# Patient Record
(demographics unavailable — no encounter records)

---

## 2024-12-05 NOTE — HISTORY OF PRESENT ILLNESS
[One fall no injury in past year] : Patient reported one fall in the past year without injury [Patient is independent with] : bathing [Independent] : managing finances [] : Assistance needed doing laundry [Walker] : walker [Smoke Detector] : smoke detector [Carbon Monoxide Detector] : carbon monoxide detector [Grab Bars] : grab bars [Shower Chair] : shower chair [Night Light] : night light [Anti-Slip Measures] : anti-slip measures [Driving Concerns] : driving concerns noted [Wears Seat Belt] : wears seat belt [Wears Sunscreen] : wears sunscreen [NO] : No [Little interest or pleasure doing things] : 1) Little interest or pleasure doing things [Feeling down, depressed, or hopeless] : 2) Feeling down, depressed, or hopeless [0] : 2) Feeling down, depressed, or hopeless: Not at all (0) [PHQ-2 Negative - No further assessment needed] : PHQ-2 Negative - No further assessment needed [FreeTextEntry1] : 89 y/o M with PMHx of knee OA (R>L) chronic lower back pain, h/o sacral fracture, HLD presents to the practice for a follow up visit.  Back pain: Acute on chronic, feels it has gotten worse gradually since about 2 weeks. No falls or trauma noted. Denies radiculopathy, groin anesthesia, urinary/bowel incontinence. Using RW for support. Not really using any pain medications, would like to avoid if possible.  BPH: No prostate cancer, gets up at least 3 to 4 times a night, no blood in the urine, no family history of prostate cancer No UTI's.  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 Has not been on chemotherapy.  OA: Most notably in his lower back and knees. No neuropathy noted. No groin anesthesia noted. Take Tylenol for pain. Has done several bouts of PT in the past and not interested at this time. Uses a RW to ambulate. Has had a fall around ?March 2023. Had a sacral fx which was not treated with surgery.  Cognitive decline: Feels he is getting mentally slower and has issues with short-term memory. Long-term memory intact. No hallucinations/delusions, anxiety, depression noted. Sleep is mostly intact though he has to get up at night to go to the bathroom. Wife passed away in her sleep in June 2024, cardiopulmonary arrest 2/2 heart failure. Has no formal HHA support, only has a cleaning lady for many years, once a week. Daughter/HCP Helen Drea ([phone]) and son-in-law/HCP Harrison Drea ([phone]) are deeply involved with patient's care despite living in [place]. They visit almost bi-weekly and communicate with patient daily over the phone multiple times a day. Would like for patient to move to [place] with them but want him to make his own decision. He chooses to stay in [place].  Hearing loss: Uses hearing aids but we needed to use pocket talker to help further. [Stair Lift] : no stair lift used in home [EOB2Unaxt] : 0

## 2024-12-05 NOTE — HISTORY OF PRESENT ILLNESS
[One fall no injury in past year] : Patient reported one fall in the past year without injury [Patient is independent with] : bathing [Independent] : managing finances [] : Assistance needed doing laundry [Walker] : walker [Smoke Detector] : smoke detector [Carbon Monoxide Detector] : carbon monoxide detector [Grab Bars] : grab bars [Shower Chair] : shower chair [Night Light] : night light [Anti-Slip Measures] : anti-slip measures [Driving Concerns] : driving concerns noted [Wears Seat Belt] : wears seat belt [Wears Sunscreen] : wears sunscreen [NO] : No [Little interest or pleasure doing things] : 1) Little interest or pleasure doing things [Feeling down, depressed, or hopeless] : 2) Feeling down, depressed, or hopeless [0] : 2) Feeling down, depressed, or hopeless: Not at all (0) [PHQ-2 Negative - No further assessment needed] : PHQ-2 Negative - No further assessment needed [FreeTextEntry1] : 89 y/o M with PMHx of knee OA (R>L) chronic lower back pain, h/o sacral fracture, HLD presents to the practice for a follow up visit.  Back pain: Acute on chronic, feels it has gotten worse gradually since about 2 weeks. No falls or trauma noted. Denies radiculopathy, groin anesthesia, urinary/bowel incontinence. Using RW for support. Not really using any pain medications, would like to avoid if possible.  BPH: No prostate cancer, gets up at least 3 to 4 times a night, no blood in the urine, no family history of prostate cancer No UTI's.  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 Has not been on chemotherapy.  OA: Most notably in his lower back and knees. No neuropathy noted. No groin anesthesia noted. Take Tylenol for pain. Has done several bouts of PT in the past and not interested at this time. Uses a RW to ambulate. Has had a fall around ?March 2023. Had a sacral fx which was not treated with surgery.  Cognitive decline: Feels he is getting mentally slower and has issues with short-term memory. Long-term memory intact. No hallucinations/delusions, anxiety, depression noted. Sleep is mostly intact though he has to get up at night to go to the bathroom. Wife passed away in her sleep in June 2024, cardiopulmonary arrest 2/2 heart failure. Has no formal HHA support, only has a cleaning lady for many years, once a week. Daughter/HCP Helen Drea ([phone]) and son-in-law/HCP Harrison Drea ([phone]) are deeply involved with patient's care despite living in [place]. They visit almost bi-weekly and communicate with patient daily over the phone multiple times a day. Would like for patient to move to [place] with them but want him to make his own decision. He chooses to stay in [place].  Hearing loss: Uses hearing aids but we needed to use pocket talker to help further. [Stair Lift] : no stair lift used in home [[id number]] : 0

## 2025-03-06 NOTE — PHYSICAL EXAM
[Midline] : trachea located in midline position [Normal] : no rashes [de-identified] : bilateral cerumen impaction, removed. Wearing bilateral hearing aids.

## 2025-03-06 NOTE — HISTORY OF PRESENT ILLNESS
[de-identified] : Jerome Brush is a 91 year old male with history of SNHL who presents for evaluation of bilateral aural fullness. He states he has had bilateral aural fullness that feels like wax for several months. He uses hearing aids. He states his last audiogram with outside audiology was stable and was done 3 months ago. He denies change in hearing loss, tinnitus, or vertigo. He denies otalgia or otorrhea. He denies recurrent ear infections or fever. He denies family history of hearing loss. He denies ototoxic drug exposure or significant loud noise exposure. He denies head trauma

## 2025-03-06 NOTE — REVIEW OF SYSTEMS
[Hearing Loss] : hearing loss [Joint Pain] : joint pain [Depression] : depression [Negative] : Heme/Lymph

## 2025-03-06 NOTE — ASSESSMENT
[FreeTextEntry1] : Jerome Brush presents for evaluation of bilateral aural fullness. Otoscopic exam revealed bilateral cerumen impaction, which was removed. After removal, remainder exam wnl. He states resolution of aural fullness. He will follow up with his audiologist.  His blood pressure is elevated today. He called his PCP who will see him today.  -follow up prn

## 2025-03-14 NOTE — HISTORY OF PRESENT ILLNESS
[One fall no injury in past year] : Patient reported one fall in the past year without injury [Patient is independent with] : bathing [Independent] : managing finances [] : Assistance needed doing laundry [Walker] : walker [Smoke Detector] : smoke detector [Carbon Monoxide Detector] : carbon monoxide detector [Grab Bars] : grab bars [Shower Chair] : shower chair [Night Light] : night light [Anti-Slip Measures] : anti-slip measures [Driving Concerns] : driving concerns noted [Wears Seat Belt] : wears seat belt [Wears Sunscreen] : wears sunscreen [Little interest or pleasure doing things] : 1) Little interest or pleasure doing things [Feeling down, depressed, or hopeless] : 2) Feeling down, depressed, or hopeless [0] : 2) Feeling down, depressed, or hopeless: Not at all (0) [PHQ-2 Negative - No further assessment needed] : PHQ-2 Negative - No further assessment needed [I have developed a follow-up plan documented below in the note.] : I have developed a follow-up plan documented below in the note. [FreeTextEntry1] : 89 y/o M with PMHx of knee OA (R>L) chronic lower back pain, h/o sacral fracture, HLD presents to the practice for an acute visit.  HTN: BP elevated with ENT. Denies chest pain, palpitations, SOB. Has had elevated BP in the past in the office which resolved after retakes but this time it is not going down. Denies pain or anxiety at this time.  Back pain: No falls or trauma noted. Denies radiculopathy, groin anesthesia, urinary/bowel incontinence. Using RW for support. Not really using any pain medications, would like to avoid if possible. Not fully utilizing tylenol.  BPH: No prostate cancer, gets up at least 3 to 4 times a night, no blood in the urine, no family history of prostate cancer No UTI's.  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 Has not been on chemotherapy.  OA: Most notably in his lower back and knees. No neuropathy noted. No groin anesthesia noted. Take Tylenol for pain. Has done several bouts of PT in the past and not interested at this time. Uses a RW to ambulate. Has had a fall around ?March 2023. Had a sacral fx which was not treated with surgery.  Cognitive decline: Feels he is getting mentally slower and has issues with short-term memory. Long-term memory intact. No hallucinations/delusions, anxiety, depression noted. Sleep is mostly intact though he has to get up at night to go to the bathroom. Wife passed away in her sleep in June 2024, cardiopulmonary arrest 2/2 heart failure. Has no formal HHA support, only has a cleaning lady for many years, once a week. Daughter/HCP Helen Drea ([phone]) and son-in-law/HCP Harrison Drea ([phone]) are deeply involved with patient's care despite living in [place]. They visit almost bi-weekly and communicate with patient daily over the phone multiple times a day. Would like for patient to move to [place] with them but want him to make his own decision. He chooses to stay in [place].  Hearing loss: Uses hearing aids but we needed to use pocket talker to help further. [Stair Lift] : no stair lift used in home [[id number]] : 0

## 2025-03-14 NOTE — REVIEW OF SYSTEMS
1-2 persons [As Noted in HPI] : as noted in HPI [As noted in HPI] : as noted in HPI [Negative] : Heme/Lymph

## 2025-03-14 NOTE — ASSESSMENT
[FreeTextEntry1] : RTC in 1 week for follow up visit.  Total time spent: 30 mins This includes chart review, patient assessment, discussion and collaboration with interdisciplinary team members, excluding time spent on separately billable services.

## 2025-03-16 NOTE — PHYSICAL EXAM
[Alert] : alert [No Acute Distress] : in no acute distress [No Respiratory Distress] : no respiratory distress [Respiration, Rhythm And Depth] : normal respiratory rhythm and effort [Normal S1, S2] : normal S1 and S2 [Heart Rate And Rhythm] : heart rate was normal and rhythm regular [Bowel Sounds] : normal bowel sounds [Abdomen Tenderness] : non-tender [Abdomen Soft] : soft [Involuntary Movements] : no involuntary movements were seen [Normal Color / Pigmentation] : normal skin color and pigmentation [No Focal Deficits] : no focal deficits [Normal Affect] : the affect was normal [Normal Mood] : the mood was normal [Normal Outer Ear/Nose] : the ears and nose were normal in appearance [Normal Appearance] : the appearance of the neck was normal [Supple] : the neck was supple [No Acc Muscle Use] : no accessory muscle use [Auscultation Breath Sounds / Voice Sounds] : lungs were clear to auscultation bilaterally [No Spinal Tenderness] : no spinal tenderness [Normal Turgor] : normal skin turgor [de-identified] : Hard of hearing, needed pocket talker despite having hearing aids

## 2025-03-16 NOTE — HISTORY OF PRESENT ILLNESS
[One fall no injury in past year] : Patient reported one fall in the past year without injury [Patient is independent with] : bathing [Independent] : managing finances [] : Assistance needed doing laundry [Walker] : walker [Smoke Detector] : smoke detector [Carbon Monoxide Detector] : carbon monoxide detector [Grab Bars] : grab bars [Shower Chair] : shower chair [Night Light] : night light [Anti-Slip Measures] : anti-slip measures [Driving Concerns] : driving concerns noted [Wears Seat Belt] : wears seat belt [Wears Sunscreen] : wears sunscreen [Little interest or pleasure doing things] : 1) Little interest or pleasure doing things [Feeling down, depressed, or hopeless] : 2) Feeling down, depressed, or hopeless [0] : 2) Feeling down, depressed, or hopeless: Not at all (0) [PHQ-2 Negative - No further assessment needed] : PHQ-2 Negative - No further assessment needed [I have developed a follow-up plan documented below in the note.] : I have developed a follow-up plan documented below in the note. [FreeTextEntry1] : 92 y/o M with PMHx of knee OA (R>L) chronic lower back pain, h/o sacral fracture, HLD presents to the practice for a follow up visit.  HTN: Was recently seen by me for reported HTN from ENT appointment. At our office he had a BP of 187/66 and repeat of 165/66. I had started him on amlodipine 2.5 mg daily. Remainder of medications are unchanged. Denies chest pain, palpitations, SOB. Still has elevated BP w/o acute sx.  Back pain: Acute on chronic, feels it has gotten worse gradually since about 2 weeks. No falls or trauma noted. Denies radiculopathy, groin anesthesia, urinary/bowel incontinence. Using RW for support. Not really using any pain medications, would like to avoid if possible.  BPH: No prostate cancer, gets up at least 3 to 4 times a night, no blood in the urine, no family history of prostate cancer No UTI's.  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 Has not been on chemotherapy.  OA: Most notably in his lower back and knees. No neuropathy noted. No groin anesthesia noted. Take Tylenol for pain. Has done several bouts of PT in the past and not interested at this time. Uses a RW to ambulate. Has had a fall around ?March 2023. Had a sacral fx which was not treated with surgery.  Cognitive decline: Feels he is getting mentally slower and has issues with short-term memory. Long-term memory intact. No hallucinations/delusions, anxiety, depression noted. Sleep is mostly intact though he has to get up at night to go to the bathroom. Wife passed away in her sleep in June 2024, cardiopulmonary arrest 2/2 heart failure. Has no formal HHA support, only has a cleaning lady for many years, once a week. Daughter/HCP Helen Shepard ([phone]) and son-in-law/HCP Harrison Drea ([phone]) are deeply involved with patient's care despite living in [place]. They visit almost bi-weekly and communicate with patient daily over the phone multiple times a day. Would like for patient to move to [place] with them but want him to make his own decision. He chooses to stay in [place].  Hearing loss: Uses hearing aids, need to use pocket talker to aid in the conversation.  [Stair Lift] : no stair lift used in home [[id number]] : 0

## 2025-03-16 NOTE — PHYSICAL EXAM
[Alert] : alert [No Acute Distress] : in no acute distress [No Respiratory Distress] : no respiratory distress [Respiration, Rhythm And Depth] : normal respiratory rhythm and effort [Normal S1, S2] : normal S1 and S2 [Heart Rate And Rhythm] : heart rate was normal and rhythm regular [Bowel Sounds] : normal bowel sounds [Abdomen Tenderness] : non-tender [Abdomen Soft] : soft [Involuntary Movements] : no involuntary movements were seen [Normal Color / Pigmentation] : normal skin color and pigmentation [No Focal Deficits] : no focal deficits [Normal Affect] : the affect was normal [Normal Mood] : the mood was normal [Normal Outer Ear/Nose] : the ears and nose were normal in appearance [Normal Appearance] : the appearance of the neck was normal [Supple] : the neck was supple [No Acc Muscle Use] : no accessory muscle use [Auscultation Breath Sounds / Voice Sounds] : lungs were clear to auscultation bilaterally [No Spinal Tenderness] : no spinal tenderness [Normal Turgor] : normal skin turgor [de-identified] : Hard of hearing, needed pocket talker despite having hearing aids

## 2025-03-16 NOTE — HISTORY OF PRESENT ILLNESS
Physical Therapy    Pts recheck visit was cx by our dept due to no authorization from pts insurance.      Erick Benavides, PT #[id number] [One fall no injury in past year] : Patient reported one fall in the past year without injury [Patient is independent with] : bathing [Independent] : managing finances [] : Assistance needed doing laundry [Walker] : walker [Smoke Detector] : smoke detector [Carbon Monoxide Detector] : carbon monoxide detector [Grab Bars] : grab bars [Shower Chair] : shower chair [Night Light] : night light [Anti-Slip Measures] : anti-slip measures [Driving Concerns] : driving concerns noted [Wears Seat Belt] : wears seat belt [Wears Sunscreen] : wears sunscreen [Little interest or pleasure doing things] : 1) Little interest or pleasure doing things [Feeling down, depressed, or hopeless] : 2) Feeling down, depressed, or hopeless [0] : 2) Feeling down, depressed, or hopeless: Not at all (0) [PHQ-2 Negative - No further assessment needed] : PHQ-2 Negative - No further assessment needed [I have developed a follow-up plan documented below in the note.] : I have developed a follow-up plan documented below in the note. [FreeTextEntry1] : 92 y/o M with PMHx of knee OA (R>L) chronic lower back pain, h/o sacral fracture, HLD presents to the practice for a follow up visit.  HTN: Was recently seen by me for reported HTN from ENT appointment. At our office he had a BP of 187/66 and repeat of 165/66. I had started him on amlodipine 2.5 mg daily. Remainder of medications are unchanged. Denies chest pain, palpitations, SOB. Still has elevated BP w/o acute sx.  Back pain: Acute on chronic, feels it has gotten worse gradually since about 2 weeks. No falls or trauma noted. Denies radiculopathy, groin anesthesia, urinary/bowel incontinence. Using RW for support. Not really using any pain medications, would like to avoid if possible.  BPH: No prostate cancer, gets up at least 3 to 4 times a night, no blood in the urine, no family history of prostate cancer No UTI's.  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 Has not been on chemotherapy.  OA: Most notably in his lower back and knees. No neuropathy noted. No groin anesthesia noted. Take Tylenol for pain. Has done several bouts of PT in the past and not interested at this time. Uses a RW to ambulate. Has had a fall around ?March 2023. Had a sacral fx which was not treated with surgery.  Cognitive decline: Feels he is getting mentally slower and has issues with short-term memory. Long-term memory intact. No hallucinations/delusions, anxiety, depression noted. Sleep is mostly intact though he has to get up at night to go to the bathroom. Wife passed away in her sleep in June 2024, cardiopulmonary arrest 2/2 heart failure. Has no formal HHA support, only has a cleaning lady for many years, once a week. Daughter/HCP Helen Shepard ([phone]) and son-in-law/HCP Harrison Drea ([phone]) are deeply involved with patient's care despite living in [place]. They visit almost bi-weekly and communicate with patient daily over the phone multiple times a day. Would like for patient to move to [place] with them but want him to make his own decision. He chooses to stay in [place].  Hearing loss: Uses hearing aids, need to use pocket talker to aid in the conversation.  [Stair Lift] : no stair lift used in home [[id number]] : 0

## 2025-03-16 NOTE — PHYSICAL EXAM
[Alert] : alert [No Acute Distress] : in no acute distress [No Respiratory Distress] : no respiratory distress [Respiration, Rhythm And Depth] : normal respiratory rhythm and effort [Normal S1, S2] : normal S1 and S2 [Heart Rate And Rhythm] : heart rate was normal and rhythm regular [Bowel Sounds] : normal bowel sounds [Abdomen Tenderness] : non-tender [Abdomen Soft] : soft [Involuntary Movements] : no involuntary movements were seen [Normal Color / Pigmentation] : normal skin color and pigmentation [No Focal Deficits] : no focal deficits [Normal Affect] : the affect was normal [Normal Mood] : the mood was normal [Normal Outer Ear/Nose] : the ears and nose were normal in appearance [Normal Appearance] : the appearance of the neck was normal [Supple] : the neck was supple [No Acc Muscle Use] : no accessory muscle use [Auscultation Breath Sounds / Voice Sounds] : lungs were clear to auscultation bilaterally [No Spinal Tenderness] : no spinal tenderness [Normal Turgor] : normal skin turgor [de-identified] : Hard of hearing, needed pocket talker despite having hearing aids

## 2025-03-16 NOTE — HISTORY OF PRESENT ILLNESS
[One fall no injury in past year] : Patient reported one fall in the past year without injury [Patient is independent with] : bathing [Independent] : managing finances [] : Assistance needed doing laundry [Walker] : walker [Smoke Detector] : smoke detector [Carbon Monoxide Detector] : carbon monoxide detector [Grab Bars] : grab bars [Shower Chair] : shower chair [Night Light] : night light [Anti-Slip Measures] : anti-slip measures [Driving Concerns] : driving concerns noted [Wears Seat Belt] : wears seat belt [Wears Sunscreen] : wears sunscreen [Little interest or pleasure doing things] : 1) Little interest or pleasure doing things [Feeling down, depressed, or hopeless] : 2) Feeling down, depressed, or hopeless [0] : 2) Feeling down, depressed, or hopeless: Not at all (0) [PHQ-2 Negative - No further assessment needed] : PHQ-2 Negative - No further assessment needed [I have developed a follow-up plan documented below in the note.] : I have developed a follow-up plan documented below in the note. [FreeTextEntry1] : 92 y/o M with PMHx of knee OA (R>L) chronic lower back pain, h/o sacral fracture, HLD presents to the practice for a follow up visit.  HTN: Was recently seen by me for reported HTN from ENT appointment. At our office he had a BP of 187/66 and repeat of 165/66. I had started him on amlodipine 2.5 mg daily. Remainder of medications are unchanged. Denies chest pain, palpitations, SOB. Still has elevated BP w/o acute sx.  Back pain: Acute on chronic, feels it has gotten worse gradually since about 2 weeks. No falls or trauma noted. Denies radiculopathy, groin anesthesia, urinary/bowel incontinence. Using RW for support. Not really using any pain medications, would like to avoid if possible.  BPH: No prostate cancer, gets up at least 3 to 4 times a night, no blood in the urine, no family history of prostate cancer No UTI's.  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 Has not been on chemotherapy.  OA: Most notably in his lower back and knees. No neuropathy noted. No groin anesthesia noted. Take Tylenol for pain. Has done several bouts of PT in the past and not interested at this time. Uses a RW to ambulate. Has had a fall around ?March 2023. Had a sacral fx which was not treated with surgery.  Cognitive decline: Feels he is getting mentally slower and has issues with short-term memory. Long-term memory intact. No hallucinations/delusions, anxiety, depression noted. Sleep is mostly intact though he has to get up at night to go to the bathroom. Wife passed away in her sleep in June 2024, cardiopulmonary arrest 2/2 heart failure. Has no formal HHA support, only has a cleaning lady for many years, once a week. Daughter/HCP Helen Shepard ([phone]) and son-in-law/HCP Harrison Drea ([phone]) are deeply involved with patient's care despite living in [place]. They visit almost bi-weekly and communicate with patient daily over the phone multiple times a day. Would like for patient to move to [place] with them but want him to make his own decision. He chooses to stay in [place].  Hearing loss: Uses hearing aids, need to use pocket talker to aid in the conversation.  [Stair Lift] : no stair lift used in home [[id number]] : 0

## 2025-03-16 NOTE — ASSESSMENT
[FreeTextEntry1] : RTC in 1 week for cognitive evaluation.  Total time spent: 30 mins This includes chart review, patient assessment, discussion and collaboration with interdisciplinary team members, excluding time spent on separately billable services.  EKG obtained to assist in diagnosis and management of assessed problem(s).  Patient seen and evaluated with fellow physician. I was present during key portions of the history and physical exam and reviewed and agree with fellow physician's assessment and plan unless otherwise as noted below.

## 2025-04-22 NOTE — GOALS
[Staff: _____] : staff - [unfilled] [Healthcare proxy document is in chart] : Healthcare proxy document is in chart [DNR/DNI] : DNR/DNI [MOLST Completed] : MOLST Completed [FreeTextEntry1] : Helen [FreeTextEntry2] : daughter  [FreeTextEntry3] : [phone] [FreeTextEntry7] : Advance Directives: HCP/Advance Directives/Living will:Helen Shepard HCP/Alternate Decision Maker:Harrison Shepard MOLST:completed -DNR/DNI -send to the hospital if necessary -limited medical interventions -determine use or limitation feeding tube -use IV fluids -use antibiotics -determine use or limitation  HD   What matters most? "the health of my daughter and son in law"

## 2025-05-27 NOTE — HISTORY OF PRESENT ILLNESS
[One fall no injury in past year] : Patient reported one fall in the past year without injury [Patient is independent with] : bathing [Independent] : managing finances [] : Assistance needed doing laundry [FAST Score: ____] : Functional Assessment Scale (FAST) Score: [unfilled] [Walker] : walker [Smoke Detector] : smoke detector [Carbon Monoxide Detector] : carbon monoxide detector [Grab Bars] : grab bars [Shower Chair] : shower chair [Night Light] : night light [Anti-Slip Measures] : anti-slip measures [Driving Concerns] : driving concerns noted [Wears Seat Belt] : wears seat belt [Wears Sunscreen] : wears sunscreen [Little interest or pleasure doing things] : 1) Little interest or pleasure doing things [Feeling down, depressed, or hopeless] : 2) Feeling down, depressed, or hopeless [0] : 2) Feeling down, depressed, or hopeless: Not at all (0) [PHQ-2 Negative - No further assessment needed] : PHQ-2 Negative - No further assessment needed [I have developed a follow-up plan documented below in the note.] : I have developed a follow-up plan documented below in the note. [Reviewed updated] : Reviewed, updated [Designated Healthcare Proxy] : Designated healthcare proxy [Name: ___] : Health Care Proxy's Name: [unfilled]  [Relationship: ___] : Relationship: [unfilled] [DNR] : DNR [DNI] : DNI [I will adhere to the patient's wishes.] : I will adhere to the patient's wishes. [FreeTextEntry1] : GUIDE Model ID #:[id number]   90 y/o M with PMHx of knee OA (R>L) chronic lower back pain, h/o sacral fracture, HLD presents to the practice for a cognitive evaluation. Pt. presents alone, for voluntary enrollment in the GUIDE model program for management of dementia-related issues and coordination of dementia care. -will not be enrolling into GUIDE, pt. moving to [place] in the next 2-3 months -summarized visit to daughter Helen via telephone  PCP:     #dementia  - Mentation: (initial, progression, current) Cognitive decline: Feels he is getting mentally slower and has issues with short-term memory. Long-term memory intact. No hallucinations/delusions, anxiety, depression noted. Sleep is mostly intact though he has to get up at night to go to the bathroom. Wife passed away in her sleep in June 2024, cardiopulmonary arrest 2/2 heart failure. Has no formal HHA support, only has a cleaning lady for many years, once a week. Daughter/HCP Helen Shepard ([phone]) and son-in-law/HCP Harrison Drea ([phone]) are deeply involved with patient's care despite living in [place]. They visit almost bi-weekly and communicate with patient daily over the phone multiple times a day. Would like for patient to move to [place] with them but want him to make his own decision. He chooses to stay in [place]. MOCA: 22/30 continues to drive - MRI Brain: - PET Brain FDG: - MoCA: - BPS: - ANXIETY screen: - DEPRESSION screen: - SLEEP:sleeping well  - APPETITE:eating well, cooking and sometimes eats out  - TOILETING: denies UI, denies FI - MOBILITY: use walker  - Hearing impairment: wears hearing aids, ENT f/u on Thursday  - Vision impairment: wears glasses  -goes to the gym 2x/week  -moving to [facility] in [place] called [facility]  -connected pt. to [facility] in [place], they have a GUIDE program  HTN: -taking his medications daily, on amlodipine 10mg QD and losartan 12.5mg, denies SE -BP improved, today 137/60 -orthostatic BP negative -Denies chest pain, palpitations, SOB  Back pain: Acute on chronic, feels it has gotten worse gradually since about 2 weeks. No falls or trauma noted. Denies radiculopathy, groin anesthesia, urinary/bowel incontinence. Taking Tylenol BID for pain.  BPH: No prostate cancer, gets up at least 3 to 4 times a night, no blood in the urine, no family history of prostate cancer No UTI's.  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 Has not been on chemotherapy.  OA: Most notably in his lower back and knees. No neuropathy noted. No groin anesthesia noted. Take Tylenol for pain. Has done several bouts of PT in the past and not interested at this time. Uses a RW to ambulate. Has had a fall around ?March 2023. Had a sacral fx which was not treated with surgery. I encouraged him to maximize tylenol extra strength 1 g to TID. Would prefer to avoid gabapentin for now.   Hearing loss: Uses hearing aids, need to use pocket talker to aid in the conversation.   #HCM -Dr. Banks     Denies pain. Denies acute visits/falls. Denies HA/dizziness, SOB/CP, abdominal pain, dysuria, reports daily BMs regular.   CAREGIVER 1:  Name/Relationship:Helen  Involvement in care: Mailing Address: Phone Number: E-mail: Best time to reach this person:  Patient permission to contact this person:  CAREGIVER 2:  Name/Relationship:Harrison  Involvement in care: Mailing Address: Phone Number: E-mail: Best time to reach this person:  Patient permission to contact this person:yes   Suspect Medications (associated with mental status changes): no  Recent hospitalization/ ED Visits/ Nursing Home Stays:no   Social History: Primary Language:English  Marital Status: Children:( 1 living daughter, son passed away) Education: Occupation:sales Activity/Exercise: Alcohol:no, occasional beer Sexually active:  Driving Habits: continues to drive, no MVA, denies getting lost  Firearms:no  Living Situation: Housing (stairs/levels): Home-single story Length at Residence: Lives with: Caregivers (non-paid and paid):none cleaning lady 1x/month Safety Concerns: none  Wandering: no  Falls:no Fear of Falling: no  Financial Situation:  Advance Directives: HCP/Advance Directives/Living will:Helen Shepard HCP/Alternate Decision Maker:Harrison BONILLAST:completed -DNR/DNI -send to the hospital if necessary -limited medical interventions -determine use or limitation feeding tube -use IV fluids -use antibiotics -determine use or limitation  HD   What matters most? "the health of my daughter and son in law"    [Stair Lift] : no stair lift used in home [de-identified] : 6 [[id number]] : 0 [AdvancecareDate] : 5/27/2025 [FreeTextEntry4] : Advance Directives: HCP/Advance Directives/Living will:Helen Shepard HCP/Alternate Decision Maker:Harrison Shepard MOLST:completed -DNR/DNI -send to the hospital if necessary -limited medical interventions -determine use or limitation feeding tube -use IV fluids -use antibiotics -determine use or limitation  HD   What matters most? "the health of my daughter and son in law"

## 2025-05-27 NOTE — PHYSICAL EXAM
[Alert] : alert [No Oral Pallor] : no oral pallor [No Oral Cyanosis] : no oral cyanosis [Normal Outer Ear/Nose] : the ears and nose were normal in appearance [Normal Appearance] : the appearance of the neck was normal [Supple] : the neck was supple [No Respiratory Distress] : no respiratory distress [No Acc Muscle Use] : no accessory muscle use [Respiration, Rhythm And Depth] : normal respiratory rhythm and effort [Auscultation Breath Sounds / Voice Sounds] : lungs were clear to auscultation bilaterally [Normal PMI] : the apical impulse was normal [Normal S1, S2] : normal S1 and S2 [Murmurs] : no murmurs [Heart Rate And Rhythm] : heart rate was normal and rhythm regular [Edema] : edema was not present [Bowel Sounds] : normal bowel sounds [Abdomen Tenderness] : non-tender [Abdomen Soft] : soft [No Spinal Tenderness] : no spinal tenderness [Normal Color / Pigmentation] : normal skin color and pigmentation [Normal Turgor] : normal skin turgor [No Focal Deficits] : no focal deficits [Normal Affect] : the affect was normal [Normal Mood] : the mood was normal [___ / 5] : Visuospatial / Executive: [unfilled] / 5 [0 / 0] : Memory: 0 / 0 [___ / 3] : Attention (Serial 7 subtraction): [unfilled] / 3 [___ / 1] : Fluency: [unfilled] / 1 [___ / 2] : Abstraction: [unfilled] / 2 [___ / 5] : Delayed Recall: [unfilled] / 5 [___ / 6] : Orientation: [unfilled] / 6 [de-identified] : elderly male, DEANNA, LALITHA&Ox4 [de-identified] : uses walker to ambulate  [MocaTotal] : 22 [FreeTextEntry1] : 03/20/2025

## 2025-05-27 NOTE — REVIEW OF SYSTEMS
done [As noted in HPI] : as noted in HPI [As Noted in HPI] : as noted in HPI [Negative] : Heme/Lymph

## 2025-06-30 NOTE — HISTORY OF PRESENT ILLNESS
[One fall no injury in past year] : Patient reported one fall in the past year without injury [Patient is independent with] : bathing [Independent] : managing finances [] : Assistance needed doing laundry [Walker] : walker [Smoke Detector] : smoke detector [Carbon Monoxide Detector] : carbon monoxide detector [Grab Bars] : grab bars [Shower Chair] : shower chair [Night Light] : night light [Anti-Slip Measures] : anti-slip measures [Driving Concerns] : driving concerns noted [Wears Seat Belt] : wears seat belt [Wears Sunscreen] : wears sunscreen [Little interest or pleasure doing things] : 1) Little interest or pleasure doing things [Feeling down, depressed, or hopeless] : 2) Feeling down, depressed, or hopeless [0] : 2) Feeling down, depressed, or hopeless: Not at all (0) [PHQ-2 Negative - No further assessment needed] : PHQ-2 Negative - No further assessment needed [I have developed a follow-up plan documented below in the note.] : I have developed a follow-up plan documented below in the note. [FreeTextEntry1] : 92 y/o M with PMHx of knee OA (R>L) chronic lower back pain, h/o sacral fracture, HLD presents to the practice for a cognitive evaluation.  HTN: BP has been controlled with amlodipine 10 mg daily, losartan 12.5 mg daily. Denies chest pain, palpitations, SOB. Does not check BP at home.  Back pain: Acute on chronic, feels it has gotten worse gradually since about 2 weeks. No falls or trauma noted. Denies radiculopathy, groin anesthesia, urinary/bowel incontinence. Using RW for support. Uses extra strength tylenol 1 g BID only.  BPH: No prostate cancer, gets up at least 3 to 4 times a night, no blood in the urine, no family history of prostate cancer No UTI's. On tamsulosin 0.4 mg QHS.  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 Has not been on chemotherapy.  OA: Most notably in his lower back and knees. No neuropathy noted. No groin anesthesia noted. Take Tylenol for pain. Has done several bouts of PT in the past and not interested at this time. Uses a RW to ambulate. Has had a fall around ?March 2023. Had a sacral fx which was not treated with surgery. I encouraged him to maximize tylenol extra strength 1 g to TID. Would prefer to avoid gabapentin for now.  Cognitive decline: Feels he is getting mentally slower and has issues with short-term memory. Long-term memory intact. No hallucinations/delusions, anxiety, depression noted. Sleep is mostly intact though he has to get up at night to go to the bathroom. Wife passed away in her sleep in June 2024, cardiopulmonary arrest 2/2 heart failure. Has no formal HHA support, only has a cleaning lady for many years, once a week. Daughter/HCP Helen Shepard ([phone]) and son-in-law/HCP Harrison Drea ([phone]) are deeply involved with patient's care despite living in [place]. They visit almost bi-weekly and communicate with patient daily over the phone multiple times a day. Would like for patient to move to [place] with them but want him to make his own decision. He chooses to stay in [place]. MOCA: 22/30  He has already sold his home and waiting to close. Following that, he is going to move to [place] to live near his daughter's family.  Hearing loss: Uses hearing aids, need to use pocket talker to aid in the conversation.  [Stair Lift] : no stair lift used in home [[id number]] : 0

## 2025-06-30 NOTE — ASSESSMENT
[FreeTextEntry1] : RTC in 4-6 weeks for follow up visit.  Total time spent: 30 mins This includes chart review, patient assessment, discussion and collaboration with interdisciplinary team members, excluding time spent on separately billable services.

## 2025-06-30 NOTE — HISTORY OF PRESENT ILLNESS
[One fall no injury in past year] : Patient reported one fall in the past year without injury [Patient is independent with] : bathing [Independent] : managing finances [] : Assistance needed doing laundry [Walker] : walker [Smoke Detector] : smoke detector [Carbon Monoxide Detector] : carbon monoxide detector [Grab Bars] : grab bars [Shower Chair] : shower chair [Night Light] : night light [Anti-Slip Measures] : anti-slip measures [Driving Concerns] : driving concerns noted [Wears Seat Belt] : wears seat belt [Wears Sunscreen] : wears sunscreen [Little interest or pleasure doing things] : 1) Little interest or pleasure doing things [Feeling down, depressed, or hopeless] : 2) Feeling down, depressed, or hopeless [0] : 2) Feeling down, depressed, or hopeless: Not at all (0) [PHQ-2 Negative - No further assessment needed] : PHQ-2 Negative - No further assessment needed [I have developed a follow-up plan documented below in the note.] : I have developed a follow-up plan documented below in the note. [FreeTextEntry1] : 90 y/o M with PMHx of knee OA (R>L) chronic lower back pain, h/o sacral fracture, HLD presents to the practice for a cognitive evaluation.  HTN: BP has been controlled with amlodipine 10 mg daily, losartan 12.5 mg daily. Denies chest pain, palpitations, SOB. Does not check BP at home.  Back pain: Acute on chronic, feels it has gotten worse gradually since about 2 weeks. No falls or trauma noted. Denies radiculopathy, groin anesthesia, urinary/bowel incontinence. Using RW for support. Uses extra strength tylenol 1 g BID only.  BPH: No prostate cancer, gets up at least 3 to 4 times a night, no blood in the urine, no family history of prostate cancer No UTI's. On tamsulosin 0.4 mg QHS.  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 Has not been on chemotherapy.  OA: Most notably in his lower back and knees. No neuropathy noted. No groin anesthesia noted. Take Tylenol for pain. Has done several bouts of PT in the past and not interested at this time. Uses a RW to ambulate. Has had a fall around ?March 2023. Had a sacral fx which was not treated with surgery. I encouraged him to maximize tylenol extra strength 1 g to TID. Would prefer to avoid gabapentin for now.  Cognitive decline: Feels he is getting mentally slower and has issues with short-term memory. Long-term memory intact. No hallucinations/delusions, anxiety, depression noted. Sleep is mostly intact though he has to get up at night to go to the bathroom. Wife passed away in her sleep in June 2024, cardiopulmonary arrest 2/2 heart failure. Has no formal HHA support, only has a cleaning lady for many years, once a week. Daughter/HCP Helen Shepard ([phone]) and son-in-law/HCP Harrison Drea ([phone]) are deeply involved with patient's care despite living in [place]. They visit almost bi-weekly and communicate with patient daily over the phone multiple times a day. Would like for patient to move to [place] with them but want him to make his own decision. He chooses to stay in [place]. MOCA: 22/30  He has already sold his home and waiting to close. Following that, he is going to move to [place] to live near his daughter's family.  Hearing loss: Uses hearing aids, need to use pocket talker to aid in the conversation.  [Stair Lift] : no stair lift used in home [[id number]] : 0